# Patient Record
Sex: FEMALE | Race: BLACK OR AFRICAN AMERICAN | NOT HISPANIC OR LATINO | Employment: FULL TIME | URBAN - NONMETROPOLITAN AREA
[De-identification: names, ages, dates, MRNs, and addresses within clinical notes are randomized per-mention and may not be internally consistent; named-entity substitution may affect disease eponyms.]

---

## 2024-02-16 ENCOUNTER — HOSPITAL ENCOUNTER (EMERGENCY)
Facility: HOSPITAL | Age: 49
Discharge: HOME | End: 2024-02-16
Attending: EMERGENCY MEDICINE
Payer: COMMERCIAL

## 2024-02-16 VITALS — HEART RATE: 83 BPM | OXYGEN SATURATION: 96 % | RESPIRATION RATE: 20 BRPM

## 2024-02-16 DIAGNOSIS — M54.9 BACK PAIN: Primary | ICD-10-CM

## 2024-02-16 PROCEDURE — 96372 THER/PROPH/DIAG INJ SC/IM: CPT

## 2024-02-16 PROCEDURE — 99284 EMERGENCY DEPT VISIT MOD MDM: CPT | Performed by: EMERGENCY MEDICINE

## 2024-02-16 PROCEDURE — 99282 EMERGENCY DEPT VISIT SF MDM: CPT

## 2024-02-16 RX ORDER — CYCLOBENZAPRINE HCL 10 MG
10 TABLET ORAL 3 TIMES DAILY PRN
Qty: 20 TABLET | Refills: 0 | Status: SHIPPED | OUTPATIENT
Start: 2024-02-16

## 2024-02-16 RX ORDER — KETOROLAC TROMETHAMINE 30 MG/ML
30 INJECTION, SOLUTION INTRAMUSCULAR; INTRAVENOUS ONCE
Status: COMPLETED | OUTPATIENT
Start: 2024-02-16 | End: 2024-02-16

## 2024-02-16 RX ORDER — METHYLPREDNISOLONE 4 MG/1
TABLET ORAL
Qty: 21 TABLET | Refills: 0 | Status: SHIPPED | OUTPATIENT
Start: 2024-02-16

## 2024-02-16 RX ORDER — ACETAMINOPHEN 325 MG/1
650 TABLET ORAL ONCE
Status: COMPLETED | OUTPATIENT
Start: 2024-02-16 | End: 2024-02-16

## 2024-02-16 RX ADMIN — KETOROLAC TROMETHAMINE 30 MG: 30 INJECTION, SOLUTION INTRAMUSCULAR; INTRAVENOUS at 20:22

## 2024-02-16 RX ADMIN — ACETAMINOPHEN 650 MG: 325 TABLET, FILM COATED ORAL at 20:25

## 2024-02-16 NOTE — Clinical Note
Barrie Gaona was seen and treated in our emergency department on 2/16/2024.                Diagnosis:     Barrie  may return to work on return date.    She may return on this date: 02/17/2024         If you have any questions or concerns, please don't hesitate to call.      Holden Arteaga MD    ______________________________           _______________          _______________  Hospital Representative                              Date                                Time

## 2024-02-17 NOTE — ED NOTES
"Dr. Arteaga attempted to evaluate patient, asked pt to roll onto her back so he can assess her. The pt then started yelling at staff, stating \"you don't listen to what I tell you, you want to check everything else.\" Pt was not cooperative, would not answer the providers questions and demanded we let her go. Pt was given the choice to leave the emergency department or be evaluated and she agreed to be evaluated but was still not cooperative with staff. Security called after the pt started screaming again and is now at bedside.      Paola Grijalva RN  02/16/24 2020    "

## 2024-02-17 NOTE — ED PROVIDER NOTES
History  Chief Complaint   Patient presents with    Spasms     C/o back spasms for the last few hours, didn't take any medication at home       History provided by:  Medical records, patient and EMS personnel  Back Pain  Location:  Lumbar spine  Quality:  Aching  Radiates to:  Does not radiate  Pain severity:  Moderate  Pain is:  Worse during the night  Onset quality:  Sudden  Duration:  2 hours  Timing:  Constant  Progression:  Unchanged  Chronicity:  New  Context comment:  Patient has a history of sciatica, , she awoke from her nap in the cab, typical lower back pain, nonradiating.  Relieved by: Will not answer this question.  Exacerbated by: Will not answer this question.  Ineffective treatments: Will not answer this question.      None       History reviewed. No pertinent past medical history.    History reviewed. No pertinent surgical history.    History reviewed. No pertinent family history.  I have reviewed and agree with the history as documented.    E-Cigarette/Vaping     E-Cigarette/Vaping Substances     Social History     Tobacco Use    Smoking status: Every Day     Types: Cigarettes    Smokeless tobacco: Never   Substance Use Topics    Alcohol use: Not Currently    Drug use: Not Currently       Review of Systems   Unable to perform ROS: Other (Patient not cooperative, will not answer questions.  States it is only my fucking back)   Musculoskeletal:  Positive for back pain.       Physical Exam  Physical Exam  Vitals and nursing note reviewed.   Constitutional:       General: She is not in acute distress.     Appearance: Normal appearance. She is obese. She is not ill-appearing, toxic-appearing or diaphoretic.   HENT:      Head: Normocephalic and atraumatic.      Nose: Nose normal.   Cardiovascular:      Rate and Rhythm: Normal rate and regular rhythm.      Pulses: Normal pulses.      Heart sounds: Normal heart sounds. No murmur heard.     No gallop.   Pulmonary:      Effort: Pulmonary effort  is normal. No respiratory distress.      Comments: Patient refusing pulmonary exam  Abdominal:      General: Bowel sounds are normal. There is no distension.      Palpations: Abdomen is soft. There is no mass.      Tenderness: There is no abdominal tenderness. There is no right CVA tenderness, left CVA tenderness, guarding or rebound.      Hernia: No hernia is present.   Musculoskeletal:      Comments: Patient refusing back evaluation   Skin:     General: Skin is warm and dry.      Capillary Refill: Capillary refill takes less than 2 seconds.   Neurological:      General: No focal deficit present.      Mental Status: She is alert and oriented to person, place, and time.      Cranial Nerves: No cranial nerve deficit.      Sensory: No sensory deficit.      Motor: No weakness.      Comments: Patient moving around the gurney purposely, moving all extremities   Psychiatric:      Comments: Cursing, angry, demanding         Vital Signs  ED Triage Vitals [02/16/24 2009]   Temp Pulse Respirations BP SpO2   -- 83 20 -- 96 %      Temp src Heart Rate Source Patient Position - Orthostatic VS BP Location FiO2 (%)   -- Monitor Lying Right arm --      Pain Score       10 - Worst Possible Pain           Vitals:    02/16/24 2009   Pulse: 83   Patient Position - Orthostatic VS: Lying         Visual Acuity      ED Medications  Medications   ketorolac (TORADOL) injection 30 mg (30 mg Intramuscular Given 2/16/24 2022)   acetaminophen (TYLENOL) tablet 650 mg (650 mg Oral Given 2/16/24 2025)       Diagnostic Studies  Results Reviewed       None                   No orders to display              Procedures  Procedures         ED Course                               SBIRT 20yo+      Flowsheet Row Most Recent Value   Initial Alcohol Screen: US AUDIT-C     1. How often do you have a drink containing alcohol? 0 Filed at: 02/16/2024 2030   2. How many drinks containing alcohol do you have on a typical day you are drinking?  0 Filed at:  "02/16/2024 2030   3b. FEMALE Any Age, or MALE 65+: How often do you have 4 or more drinks on one occassion? 0 Filed at: 02/16/2024 2030   Audit-C Score 0 Filed at: 02/16/2024 2030   NOHEMY: How many times in the past year have you...    Used an illegal drug or used a prescription medication for non-medical reasons? Never Filed at: 02/16/2024 2030                      Medical Decision Making  2006: Patient appears well, vital signs reviewed.  Patient is uncooperative with examination, she is understands that my evaluation is limited due to this limited exam.  Patient states she knows what is wrong with her, that she has history of \"motherfucking sciatica\".  The patient is refusing further evaluation and diagnostics.  She wants pain relief and to be discharged.    Risk  OTC drugs.  Prescription drug management.             Disposition  Final diagnoses:   Back pain     Time reflects when diagnosis was documented in both MDM as applicable and the Disposition within this note       Time User Action Codes Description Comment    2/16/2024  8:16 PM Holden Arteaga Add [M54.9] Back pain           ED Disposition       ED Disposition   Discharge    Condition   Stable    Date/Time   Fri Feb 16, 2024 2016    Comment   Barrie Gaona discharge to home/self care.                   Follow-up Information       Follow up With Specialties Details Why Contact Info    PCP  Schedule an appointment as soon as possible for a visit  for further evaluation and treatment of back pain             Discharge Medication List as of 2/16/2024  9:46 PM        START taking these medications    Details   methylPREDNISolone 4 MG tablet therapy pack Use as directed on package, Print             No discharge procedures on file.    PDMP Review       None            ED Provider  Electronically Signed by             Holden Arteaga MD  02/17/24 0002    "

## 2024-02-19 ENCOUNTER — HOSPITAL ENCOUNTER (EMERGENCY)
Facility: HOSPITAL | Age: 49
Discharge: HOME | End: 2024-02-19
Attending: EMERGENCY MEDICINE
Payer: COMMERCIAL

## 2024-02-19 ENCOUNTER — APPOINTMENT (EMERGENCY)
Dept: MRI IMAGING | Facility: HOSPITAL | Age: 49
End: 2024-02-19
Payer: COMMERCIAL

## 2024-02-19 VITALS
SYSTOLIC BLOOD PRESSURE: 158 MMHG | WEIGHT: 292.33 LBS | HEIGHT: 65 IN | DIASTOLIC BLOOD PRESSURE: 82 MMHG | BODY MASS INDEX: 48.71 KG/M2 | HEART RATE: 80 BPM | TEMPERATURE: 98.6 F | RESPIRATION RATE: 18 BRPM | OXYGEN SATURATION: 97 %

## 2024-02-19 DIAGNOSIS — M51.36 DEGENERATIVE DISC DISEASE, LUMBAR: ICD-10-CM

## 2024-02-19 DIAGNOSIS — M54.41 ACUTE RIGHT-SIDED LOW BACK PAIN WITH RIGHT-SIDED SCIATICA: Primary | ICD-10-CM

## 2024-02-19 LAB
BILIRUB UR QL STRIP: NEGATIVE
CLARITY UR: NORMAL
COLOR UR: YELLOW
GLUCOSE UR STRIP-MCNC: NEGATIVE MG/DL
HGB UR QL STRIP.AUTO: NEGATIVE
KETONES UR STRIP-MCNC: NEGATIVE MG/DL
LEUKOCYTE ESTERASE UR QL STRIP: NEGATIVE
NITRITE UR QL STRIP: NEGATIVE
PH UR STRIP.AUTO: 7 [PH]
PROT UR STRIP-MCNC: NEGATIVE MG/DL
SP GR UR STRIP.AUTO: 1.01 (ref 1–1.03)
UROBILINOGEN UR QL STRIP.AUTO: 0.2 E.U./DL

## 2024-02-19 PROCEDURE — 99284 EMERGENCY DEPT VISIT MOD MDM: CPT

## 2024-02-19 PROCEDURE — 72148 MRI LUMBAR SPINE W/O DYE: CPT

## 2024-02-19 PROCEDURE — 99285 EMERGENCY DEPT VISIT HI MDM: CPT | Performed by: EMERGENCY MEDICINE

## 2024-02-19 PROCEDURE — 81003 URINALYSIS AUTO W/O SCOPE: CPT | Performed by: EMERGENCY MEDICINE

## 2024-02-19 PROCEDURE — 96361 HYDRATE IV INFUSION ADD-ON: CPT

## 2024-02-19 PROCEDURE — 96375 TX/PRO/DX INJ NEW DRUG ADDON: CPT

## 2024-02-19 PROCEDURE — 96374 THER/PROPH/DIAG INJ IV PUSH: CPT

## 2024-02-19 PROCEDURE — 96372 THER/PROPH/DIAG INJ SC/IM: CPT

## 2024-02-19 RX ORDER — OXYCODONE HYDROCHLORIDE AND ACETAMINOPHEN 5; 325 MG/1; MG/1
1 TABLET ORAL EVERY 4 HOURS PRN
Qty: 12 TABLET | Refills: 0 | Status: SHIPPED | OUTPATIENT
Start: 2024-02-19

## 2024-02-19 RX ORDER — DEXAMETHASONE SODIUM PHOSPHATE 10 MG/ML
6 INJECTION, SOLUTION INTRAMUSCULAR; INTRAVENOUS ONCE
Status: COMPLETED | OUTPATIENT
Start: 2024-02-19 | End: 2024-02-19

## 2024-02-19 RX ORDER — HYDROMORPHONE HCL/PF 1 MG/ML
0.5 SYRINGE (ML) INJECTION ONCE
Status: COMPLETED | OUTPATIENT
Start: 2024-02-19 | End: 2024-02-19

## 2024-02-19 RX ORDER — HYDROMORPHONE HCL IN WATER/PF 6 MG/30 ML
0.2 PATIENT CONTROLLED ANALGESIA SYRINGE INTRAVENOUS ONCE
Status: DISCONTINUED | OUTPATIENT
Start: 2024-02-19 | End: 2024-02-19

## 2024-02-19 RX ORDER — HYDROMORPHONE HCL IN WATER/PF 6 MG/30 ML
0.2 PATIENT CONTROLLED ANALGESIA SYRINGE INTRAVENOUS ONCE
Status: COMPLETED | OUTPATIENT
Start: 2024-02-19 | End: 2024-02-19

## 2024-02-19 RX ADMIN — DEXAMETHASONE SODIUM PHOSPHATE 6 MG: 10 INJECTION, SOLUTION INTRAMUSCULAR; INTRAVENOUS at 19:19

## 2024-02-19 RX ADMIN — SODIUM CHLORIDE 1000 ML: 0.9 INJECTION, SOLUTION INTRAVENOUS at 18:31

## 2024-02-19 RX ADMIN — HYDROMORPHONE HYDROCHLORIDE 0.2 MG: 0.2 INJECTION, SOLUTION INTRAMUSCULAR; INTRAVENOUS; SUBCUTANEOUS at 17:33

## 2024-02-19 RX ADMIN — HYDROMORPHONE HYDROCHLORIDE 0.5 MG: 1 INJECTION, SOLUTION INTRAMUSCULAR; INTRAVENOUS; SUBCUTANEOUS at 19:19

## 2024-02-19 NOTE — Clinical Note
Barrie Gaona was seen and treated in our emergency department on 2/19/2024.        No school until cleared by Family Doctor/Orthopedics        Diagnosis:     Barrie  .    She may return on this date:          If you have any questions or concerns, please don't hesitate to call.      Jody Dyer, DO    ______________________________           _______________          _______________  Hospital Representative                              Date                                Time

## 2024-02-19 NOTE — ED PROVIDER NOTES
History  Chief Complaint   Patient presents with    Back Pain     Seen here 2/16 for back pain. Reports worsening lower back pain with urinary incontinence since yesterday. Denies new injury     Patient is a 49-year-old female presenting to the emergency department complaining of worsening low back pain with urinary incontinence that started yesterday, she reports history of a back injury in September, she completed 7 weeks of physical therapy and was feeling much better, however on Friday her back pain returned, she denies any new injury or trauma, she reports some pain shooting down her leg at times but pain today is different than her previously diagnosed sciatica, she also developed urinary incontinence, denies any fecal incontinence, no numbness or tingling in her groin area or her lower extremities, denies dysuria or hematuria, was seen here 3 days ago for similar symptoms, at that point declined any imaging but now agreeable to imaging, she did fill prescriptions for methylprednisolone and Flexeril today, tried taking prescribed dosages with no relief of symptoms        Prior to Admission Medications   Prescriptions Last Dose Informant Patient Reported? Taking?   cyclobenzaprine (FLEXERIL) 10 mg tablet   No No   Sig: Take 1 tablet (10 mg total) by mouth 3 (three) times a day as needed for muscle spasms   methylPREDNISolone 4 MG tablet therapy pack   No No   Sig: Use as directed on package      Facility-Administered Medications: None       History reviewed. No pertinent past medical history.    History reviewed. No pertinent surgical history.    History reviewed. No pertinent family history.  I have reviewed and agree with the history as documented.    E-Cigarette/Vaping     E-Cigarette/Vaping Substances     Social History     Tobacco Use    Smoking status: Every Day     Types: Cigarettes    Smokeless tobacco: Never   Substance Use Topics    Alcohol use: Not Currently    Drug use: Not Currently       Review of  Systems   Constitutional: Negative.    HENT: Negative.     Eyes: Negative.    Respiratory: Negative.     Cardiovascular: Negative.    Gastrointestinal: Negative.    Endocrine: Negative.    Genitourinary: Negative.         Urinary incontinence   Musculoskeletal:  Positive for back pain.   Skin: Negative.    Allergic/Immunologic: Negative.    Neurological: Negative.    Hematological: Negative.    Psychiatric/Behavioral: Negative.         Physical Exam  Physical Exam  Constitutional:       Appearance: She is well-developed. She is obese.   HENT:      Head: Normocephalic and atraumatic.   Eyes:      Conjunctiva/sclera: Conjunctivae normal.      Pupils: Pupils are equal, round, and reactive to light.   Cardiovascular:      Rate and Rhythm: Normal rate.   Pulmonary:      Effort: Pulmonary effort is normal.   Abdominal:      Palpations: Abdomen is soft.   Musculoskeletal:         General: Normal range of motion.        Arms:       Cervical back: Normal range of motion and neck supple.      Comments: Tenderness to palpate in the region, pain with straight leg raise on the right   Skin:     General: Skin is warm and dry.   Neurological:      Mental Status: She is alert and oriented to person, place, and time.         Vital Signs  ED Triage Vitals [02/19/24 1714]   Temperature Pulse Respirations Blood Pressure SpO2   98.6 °F (37 °C) 85 16 (!) 190/95 97 %      Temp Source Heart Rate Source Patient Position - Orthostatic VS BP Location FiO2 (%)   Temporal Monitor -- Right arm --      Pain Score       10 - Worst Possible Pain           Vitals:    02/19/24 1714   BP: (!) 190/95   Pulse: 85         Visual Acuity      ED Medications  Medications   sodium chloride 0.9 % bolus 1,000 mL (1,000 mL Intravenous New Bag 2/19/24 1831)   HYDROmorphone HCl (DILAUDID) injection 0.2 mg (0.2 mg Intramuscular Given 2/19/24 1733)   dexamethasone (PF) (DECADRON) injection 6 mg (6 mg Intravenous Given 2/19/24 1919)   HYDROmorphone (DILAUDID)  injection 0.5 mg (0.5 mg Intravenous Given 2/19/24 1919)       Diagnostic Studies  Results Reviewed       Procedure Component Value Units Date/Time    UA w Reflex to Microscopic w Reflex to Culture [[id number]] Collected: 02/19/24 1732    Lab Status: Final result Specimen: Urine, Clean Catch Updated: 02/19/24 1806     Color, UA Yellow     Clarity, UA Slightly Cloudy     Specific Gravity, UA 1.015     pH, UA 7.0     Leukocytes, UA Negative     Nitrite, UA Negative     Protein, UA Negative mg/dl      Glucose, UA Negative mg/dl      Ketones, UA Negative mg/dl      Urobilinogen, UA 0.2 E.U./dl      Bilirubin, UA Negative     Occult Blood, UA Negative                   MRI lumbar spine wo contrast   Final Result by Nimesh Steven MD (02/19 1844)   Mild spondylotic changes of the lumbar spine as detailed above.         Workstation performed: [id number]                    Procedures  Procedures         ED Course  ED Course as of 02/19/24 1948 Mon Feb 19, 2024 1948 Patient reports feeling significant relief with pain medications provided thus far, imaging findings discussed at bedside as well as disposition plan                                             Medical Decision Making  Patient presents with low back pain most likely consistent with lumbar radiculopathy.  Differential diagnosis includes lumbago versus musculoskeletal spasm/sprain versus sciatica.  No back pain red flags on history or physical.  Presentation not consistent with malignancy, fracture, cauda equina syndrome, AAA, viscus perforation, osteomyelitis or epidural abscess, renal colic, pyelonephritis or other infectious process.  Given the clinical picture, no indication for further imaging at this time.  MRI shows mild disc bulging with no central disc protrusion or evidence of cauda equina syndrome      Problems Addressed:  Acute right-sided low back pain with right-sided sciatica: acute illness or injury  Degenerative disc disease, lumbar: acute illness  or injury    Amount and/or Complexity of Data Reviewed  Radiology: ordered. Decision-making details documented in ED Course.    Risk  OTC drugs.  Prescription drug management.             Disposition  Final diagnoses:   Acute right-sided low back pain with right-sided sciatica   Degenerative disc disease, lumbar     Time reflects when diagnosis was documented in both MDM as applicable and the Disposition within this note       Time User Action Codes Description Comment    2/19/2024  7:03 PM Jody Dyer [M54.41] Acute right-sided low back pain with right-sided sciatica     2/19/2024  7:03 PM Jody Dyer [M51.36] Degenerative disc disease, lumbar           ED Disposition       ED Disposition   Discharge    Condition   Stable    Date/Time   Mon Feb 19, 2024  7:03 PM    Comment   Barrie Gaona discharge to home/self care.                   Follow-up Information       Follow up With Specialties Details Why Contact Info    Rojelio Sin MD Pain Medicine, Interventional Radiology In 1 week  [address] [address]  [phone]              Patient's Medications   Discharge Prescriptions    OXYCODONE-ACETAMINOPHEN (PERCOCET) 5-325 MG PER TABLET    Take 1 tablet by mouth every 4 (four) hours as needed for moderate pain for up to 12 doses Max Daily Amount: 6 tablets       Start Date: 2/19/2024 End Date: --       Order Dose: 1 tablet       Quantity: 12 tablet    Refills: 0           PDMP Review       None            ED Provider  Electronically Signed by             Jody Dyer DO  02/19/24 3905

## 2024-02-19 NOTE — Clinical Note
Barrie Gaona was seen and treated in our emergency department on 2/19/2024.        No work until cleared by Family Doctor/Orthopedics        Diagnosis:     Barrie  .    She may return on this date:          If you have any questions or concerns, please don't hesitate to call.      Jody Dyer, DO    ______________________________           _______________          _______________  Hospital Representative                              Date                                Time

## 2024-03-06 ENCOUNTER — TELEPHONE (OUTPATIENT)
Age: 49
End: 2024-03-06

## 2024-03-06 ENCOUNTER — CONSULT (OUTPATIENT)
Dept: PAIN MEDICINE | Facility: CLINIC | Age: 49
End: 2024-03-06
Payer: COMMERCIAL

## 2024-03-06 VITALS
OXYGEN SATURATION: 97 % | DIASTOLIC BLOOD PRESSURE: 88 MMHG | HEART RATE: 97 BPM | TEMPERATURE: 96.5 F | RESPIRATION RATE: 18 BRPM | WEIGHT: 284 LBS | HEIGHT: 65 IN | BODY MASS INDEX: 47.32 KG/M2 | SYSTOLIC BLOOD PRESSURE: 138 MMHG

## 2024-03-06 DIAGNOSIS — M51.36 DEGENERATIVE DISC DISEASE, LUMBAR: ICD-10-CM

## 2024-03-06 DIAGNOSIS — M47.816 LUMBAR SPONDYLOSIS: ICD-10-CM

## 2024-03-06 DIAGNOSIS — M47.816 LUMBAR SPONDYLOSIS: Primary | ICD-10-CM

## 2024-03-06 PROCEDURE — 99244 OFF/OP CNSLTJ NEW/EST MOD 40: CPT | Performed by: ANESTHESIOLOGY

## 2024-03-06 RX ORDER — GABAPENTIN 300 MG/1
300 CAPSULE ORAL 3 TIMES DAILY
Qty: 90 CAPSULE | Refills: 2 | Status: SHIPPED | OUTPATIENT
Start: 2024-03-06

## 2024-03-06 RX ORDER — GABAPENTIN 300 MG/1
300 CAPSULE ORAL 3 TIMES DAILY
Qty: 90 CAPSULE | Refills: 2 | Status: SHIPPED | OUTPATIENT
Start: 2024-03-06 | End: 2024-03-06 | Stop reason: SDUPTHER

## 2024-03-06 NOTE — H&P (VIEW-ONLY)
Assessment  1. Lumbar spondylosis - Bilateral    2. Degenerative disc disease, lumbar  -     Ambulatory referral to Spine & Pain Management    Axial low back pain described primarily by arthritic features.  Aching, nagging, indolent, stabbing, throbbing features in axial low back without radicular components.  5/5 strength bilaterally, negative SLR.  Positive facet loading maneuvers in lumbar spine elicited pain, positive tenderness to palpation over lumbar paraspinal muscles.  Findings correlate with prominent lumbar facet arthropathy seen throughout axial low back on x-ray.  Currently she is neurologically intact without gait instability, saddle anesthesia or bowel/bladder abnormality. Risks, benefits and alternatives to FATUMA, medial branch blocks and subsequent radiofrequency ablation if successful thoroughly discussed with patient.  Handouts provided questions answered to patient satisfaction.    The patient has been experiencing moderate to severe axial spine pain that is causing functional deficit.  The pain has been present for at least 3 months and is not improving with conservative care.  Currently the patient is not experiencing any radicular features nor neurogenic claudication.  Non-facet pathology has been ruled out on clinical evaluation.    Plan  -L4-L5 LESI; (may call back to schedule procedure)  -gabapentin 300 mg t.i.d. Ordered for patient; counseled regarding sedative effects of taking this medication and provided up titration calendar.  Counseled not to take medication while driving or operating heavy machinery/using stairs  -script provided for formal physical therapy for lumbar spondylosis; Physician directed home exercise plan as per AAOS demonstrated and handouts provided that patient plans to participate with for 1 hour, twice a week for the next 6 weeks.     There are risks associated with opioid medications, including dependence, addiction and tolerance. The patient understands and  agrees to use these medications only as prescribed. Potential side effects of the medications include, but are not limited to, constipation, drowsiness, addiction, impaired judgment and risk of fatal overdose if not taken as prescribed. The patient was warned against driving while taking sedation medications or operating heavy machinery. The patient voiced understanding. Sharing medications is a felony. At this point in time, the patient is showing no signs of addiction, abuse, diversion or suicidal ideation.     [place] Prescription Drug Monitoring Program report was reviewed and was appropriate      Complete risks and benefits including bleeding, infection, tissue reaction, nerve injury and allergic reaction were discussed. The approach was demonstrated using models and literature was provided. Verbal and written consent was obtained.     My impressions and treatment recommendations were discussed in detail with the patient who verbalized understanding and had no further questions.  Discharge instructions were provided. I personally saw and examined the patient and I agree with the above discussed plan of care.    No orders of the defined types were placed in this encounter.      History of Present Illness    Barrie Gaona is a 49 y.o. female with pmhx of obesity, 1PPD smoker, presenting with with a past medical history of chronic low back pain described primarily as arthritic in nature.  She describes 8/10 low back pain that is worse in the mornings and worse at the end of the day.  The pain is characterized by achy, nagging, indolent, crampy, stabbing pain in her axial low back.  The patient describes that the pain is worse with standing for long periods of time on hard surfaces as well as with walking.  The patient is a very active individual and feels as though this pain compromises his participation with independent activities of daily living. The pain can be debilitating at times and contribute to  significant disability, compromising overall activity and independent activities of daily living.  She has tried physical therapy with limited relief of symptoms.  Medications the patient has tried in the past include nsaids, tylenol. She describes no radicular symptoms and has good strength.  She denies any weakness numbness or paresthesias. The patient denies any bowel or bladder dysfunction, saddle anesthesia or gait instability as well.      I have personally reviewed and/or updated the patient's past medical history, past surgical history, family history, social history, current medications, allergies, and vital signs today.     Review of Systems   Constitutional:  Positive for activity change.   HENT: Negative.     Eyes: Negative.    Respiratory: Negative.     Cardiovascular: Negative.    Gastrointestinal: Negative.    Endocrine: Negative.    Genitourinary: Negative.    Musculoskeletal:  Positive for arthralgias, back pain, gait problem and myalgias.   Skin: Negative.    Allergic/Immunologic: Negative.    Neurological:  Negative for weakness and numbness.   Hematological: Negative.    Psychiatric/Behavioral: Negative.     All other systems reviewed and are negative.      There is no problem list on file for this patient.      History reviewed. No pertinent past medical history.    Past Surgical History:   Procedure Laterality Date    REPAIR LABRUM Left 07/2015       History reviewed. No pertinent family history.    Social History     Occupational History    Not on file   Tobacco Use    Smoking status: Every Day     Types: Cigarettes    Smokeless tobacco: Never   Vaping Use    Vaping status: Never Used   Substance and Sexual Activity    Alcohol use: Not Currently    Drug use: Not Currently    Sexual activity: Not on file       Current Outpatient Medications on File Prior to Visit   Medication Sig    cyclobenzaprine (FLEXERIL) 10 mg tablet Take 1 tablet (10 mg total) by mouth 3 (three) times a day as needed for  "muscle spasms (Patient not taking: Reported on 3/6/2024)    methylPREDNISolone 4 MG tablet therapy pack Use as directed on package (Patient not taking: Reported on 3/6/2024)    oxyCODONE-acetaminophen (Percocet) 5-325 mg per tablet Take 1 tablet by mouth every 4 (four) hours as needed for moderate pain for up to 12 doses Max Daily Amount: 6 tablets (Patient not taking: Reported on 3/6/2024)     No current facility-administered medications on file prior to visit.       Allergies   Allergen Reactions    Penicillins Hives    Tetanus Toxoids Other (See Comments)     Paralysis    Tetanus-Diphth-Acell Pertussis Other (See Comments)     \"Paralyzed\"     Physical Exam    /88 (BP Location: Left arm, Patient Position: Sitting, Cuff Size: Large)   Pulse 97   Temp (!) 96.5 °F (35.8 °C)   Resp 18   Ht 5' 5\" (1.651 m)   Wt 129 kg (284 lb)   SpO2 97%   BMI 47.26 kg/m²     Constitutional: normal, well developed, well nourished, alert, in no distress and non-toxic and no overt pain behavior. and obese  Eyes: anicteric  HEENT: grossly intact  Neck: supple, symmetric, trachea midline and no masses   Pulmonary:even and unlabored  Cardiovascular:No edema or pitting edema present  Skin:Normal without rashes or lesions and well hydrated  Psychiatric:Mood and affect appropriate  Neurologic:Cranial Nerves II-XII grossly intact Sensation grossly intact; no clonus negative gonzalez's. Reflexes 2+ and brisk. SLR negative bilaterally.  Musculoskeletal:normal gait. 5/5 strength bilaterally with AROM in lower extremities. Normal heel toe and tip toe walking. Significant pain with lumbar facet loading bilaterally and with lateral spine rotation. ttp over lumbar paraspinal muscles. Negative regis's test, negative gaenslen's negative SIJ loading bilaterally.    Imaging    MRI LUMBAR SPINE WITHOUT CONTRAST     INDICATION: Incontinence.     COMPARISON:  None.     TECHNIQUE:  Multiplanar, multisequence imaging of the lumbar spine was " performed. .        IMAGE QUALITY:  Diagnostic     FINDINGS:     VERTEBRAL BODIES:  There is a transitional lumbosacral junction with sacralization of L5 bilaterally.  Normal alignment of the lumbar spine.  No spondylolysis or spondylolisthesis. No scoliosis.  No compression fracture.    Normal marrow signal is   identified within the visualized bony structures.  No discrete marrow lesion.     SACRUM:  Normal signal within the sacrum. No evidence of insufficiency or stress fracture.     DISTAL CORD AND CONUS:  Normal size and signal within the distal cord and conus.     PARASPINAL SOFT TISSUES:  Paraspinal soft tissues are unremarkable.     LOWER THORACIC DISC SPACES:  Normal disc height and signal.  No disc herniation, canal stenosis or foraminal narrowing.     LUMBAR DISC SPACES:     L1-L2:  Normal.     L2-L3: Minor bulge without significant stenosis.     L3-L4: Minor bulge and facet arthrosis resulting in mild left foraminal stenosis.     L4-L5: Disc desiccation with preserved disc height. Diffuse disc bulge and bilateral facet arthrosis with superimposed small central disc protrusion. Mild bilateral foraminal narrowing. Mild central canal stenosis.     L5-S1: Mild facet arthrosis without encroachment.     OTHER FINDINGS:  None.     IMPRESSION:  Mild spondylotic changes of the lumbar spine as detailed above.

## 2024-03-06 NOTE — TELEPHONE ENCOUNTER
Patient called the RX Refill Line. Message is being forwarded to the office.     Patient is requesting to resend script to pharmacy - pharmacy has not received prescription request. Patient states she needs the script resent right away. She has a lyft driving who is driving her around and does not want to take up anymore of his time.     Please contact patient at

## 2024-03-06 NOTE — LETTER
March 6, 2024     Jody Dyer, DO  [address] [facility] [address]    Patient: Barrie Gaona   YOB: 1975   Date of Visit: 3/6/2024       Dear Dr. Dyer:    Thank you for referring Barrie Gaona to me for evaluation. Below are my notes for this consultation.    If you have questions, please do not hesitate to call me. I look forward to following your patient along with you.         Sincerely,        Rojelio Sin MD        CC: No Recipients    Rojelio Sin MD  3/6/2024  1:43 PM  Signed      Assessment  1. Lumbar spondylosis - Bilateral    2. Degenerative disc disease, lumbar  -     Ambulatory referral to Spine & Pain Management    Axial low back pain described primarily by arthritic features.  Aching, nagging, indolent, stabbing, throbbing features in axial low back without radicular components.  5/5 strength bilaterally, negative SLR.  Positive facet loading maneuvers in lumbar spine elicited pain, positive tenderness to palpation over lumbar paraspinal muscles.  Findings correlate with prominent lumbar facet arthropathy seen throughout axial low back on x-ray.  Currently she is neurologically intact without gait instability, saddle anesthesia or bowel/bladder abnormality. Risks, benefits and alternatives to FATUMA, medial branch blocks and subsequent radiofrequency ablation if successful thoroughly discussed with patient.  Handouts provided questions answered to patient satisfaction.    The patient has been experiencing moderate to severe axial spine pain that is causing functional deficit.  The pain has been present for at least 3 months and is not improving with conservative care.  Currently the patient is not experiencing any radicular features nor neurogenic claudication.  Non-facet pathology has been ruled out on clinical evaluation.    Plan  -L4-L5 LESI; (may call back to schedule procedure)  -gabapentin 300 mg t.i.d. Ordered for patient; counseled regarding sedative  effects of taking this medication and provided up titration calendar.  Counseled not to take medication while driving or operating heavy machinery/using stairs  -script provided for formal physical therapy for lumbar spondylosis; Physician directed home exercise plan as per AAOS demonstrated and handouts provided that patient plans to participate with for 1 hour, twice a week for the next 6 weeks.     There are risks associated with opioid medications, including dependence, addiction and tolerance. The patient understands and agrees to use these medications only as prescribed. Potential side effects of the medications include, but are not limited to, constipation, drowsiness, addiction, impaired judgment and risk of fatal overdose if not taken as prescribed. The patient was warned against driving while taking sedation medications or operating heavy machinery. The patient voiced understanding. Sharing medications is a felony. At this point in time, the patient is showing no signs of addiction, abuse, diversion or suicidal ideation.     [place] Prescription Drug Monitoring Program report was reviewed and was appropriate      Complete risks and benefits including bleeding, infection, tissue reaction, nerve injury and allergic reaction were discussed. The approach was demonstrated using models and literature was provided. Verbal and written consent was obtained.     My impressions and treatment recommendations were discussed in detail with the patient who verbalized understanding and had no further questions.  Discharge instructions were provided. I personally saw and examined the patient and I agree with the above discussed plan of care.    No orders of the defined types were placed in this encounter.      History of Present Illness    Barrie Gaona is a 49 y.o. female with pmhx of obesity, 1PPD smoker, presenting with with a past medical history of chronic low back pain described primarily as arthritic in nature.   She describes 8/10 low back pain that is worse in the mornings and worse at the end of the day.  The pain is characterized by achy, nagging, indolent, crampy, stabbing pain in her axial low back.  The patient describes that the pain is worse with standing for long periods of time on hard surfaces as well as with walking.  The patient is a very active individual and feels as though this pain compromises his participation with independent activities of daily living. The pain can be debilitating at times and contribute to significant disability, compromising overall activity and independent activities of daily living.  She has tried physical therapy with limited relief of symptoms.  Medications the patient has tried in the past include nsaids, tylenol. She describes no radicular symptoms and has good strength.  She denies any weakness numbness or paresthesias. The patient denies any bowel or bladder dysfunction, saddle anesthesia or gait instability as well.      I have personally reviewed and/or updated the patient's past medical history, past surgical history, family history, social history, current medications, allergies, and vital signs today.     Review of Systems   Constitutional:  Positive for activity change.   HENT: Negative.     Eyes: Negative.    Respiratory: Negative.     Cardiovascular: Negative.    Gastrointestinal: Negative.    Endocrine: Negative.    Genitourinary: Negative.    Musculoskeletal:  Positive for arthralgias, back pain, gait problem and myalgias.   Skin: Negative.    Allergic/Immunologic: Negative.    Neurological:  Negative for weakness and numbness.   Hematological: Negative.    Psychiatric/Behavioral: Negative.     All other systems reviewed and are negative.      There is no problem list on file for this patient.      History reviewed. No pertinent past medical history.    Past Surgical History:   Procedure Laterality Date   • REPAIR LABRUM Left 07/2015       History reviewed. No  "pertinent family history.    Social History     Occupational History   • Not on file   Tobacco Use   • Smoking status: Every Day     Types: Cigarettes   • Smokeless tobacco: Never   Vaping Use   • Vaping status: Never Used   Substance and Sexual Activity   • Alcohol use: Not Currently   • Drug use: Not Currently   • Sexual activity: Not on file       Current Outpatient Medications on File Prior to Visit   Medication Sig   • cyclobenzaprine (FLEXERIL) 10 mg tablet Take 1 tablet (10 mg total) by mouth 3 (three) times a day as needed for muscle spasms (Patient not taking: Reported on 3/6/2024)   • methylPREDNISolone 4 MG tablet therapy pack Use as directed on package (Patient not taking: Reported on 3/6/2024)   • oxyCODONE-acetaminophen (Percocet) 5-325 mg per tablet Take 1 tablet by mouth every 4 (four) hours as needed for moderate pain for up to 12 doses Max Daily Amount: 6 tablets (Patient not taking: Reported on 3/6/2024)     No current facility-administered medications on file prior to visit.       Allergies   Allergen Reactions   • Penicillins Hives   • Tetanus Toxoids Other (See Comments)     Paralysis   • Tetanus-Diphth-Acell Pertussis Other (See Comments)     \"Paralyzed\"     Physical Exam    /88 (BP Location: Left arm, Patient Position: Sitting, Cuff Size: Large)   Pulse 97   Temp (!) 96.5 °F (35.8 °C)   Resp 18   Ht 5' 5\" (1.651 m)   Wt 129 kg (284 lb)   SpO2 97%   BMI 47.26 kg/m²     Constitutional: normal, well developed, well nourished, alert, in no distress and non-toxic and no overt pain behavior. and obese  Eyes: anicteric  HEENT: grossly intact  Neck: supple, symmetric, trachea midline and no masses   Pulmonary:even and unlabored  Cardiovascular:No edema or pitting edema present  Skin:Normal without rashes or lesions and well hydrated  Psychiatric:Mood and affect appropriate  Neurologic:Cranial Nerves II-XII grossly intact Sensation grossly intact; no clonus negative gonzalez's. Reflexes 2+ " and brisk. SLR negative bilaterally.  Musculoskeletal:normal gait. 5/5 strength bilaterally with AROM in lower extremities. Normal heel toe and tip toe walking. Significant pain with lumbar facet loading bilaterally and with lateral spine rotation. ttp over lumbar paraspinal muscles. Negative regis's test, negative gaenslen's negative SIJ loading bilaterally.    Imaging    MRI LUMBAR SPINE WITHOUT CONTRAST     INDICATION: Incontinence.     COMPARISON:  None.     TECHNIQUE:  Multiplanar, multisequence imaging of the lumbar spine was performed. .        IMAGE QUALITY:  Diagnostic     FINDINGS:     VERTEBRAL BODIES:  There is a transitional lumbosacral junction with sacralization of L5 bilaterally.  Normal alignment of the lumbar spine.  No spondylolysis or spondylolisthesis. No scoliosis.  No compression fracture.    Normal marrow signal is   identified within the visualized bony structures.  No discrete marrow lesion.     SACRUM:  Normal signal within the sacrum. No evidence of insufficiency or stress fracture.     DISTAL CORD AND CONUS:  Normal size and signal within the distal cord and conus.     PARASPINAL SOFT TISSUES:  Paraspinal soft tissues are unremarkable.     LOWER THORACIC DISC SPACES:  Normal disc height and signal.  No disc herniation, canal stenosis or foraminal narrowing.     LUMBAR DISC SPACES:     L1-L2:  Normal.     L2-L3: Minor bulge without significant stenosis.     L3-L4: Minor bulge and facet arthrosis resulting in mild left foraminal stenosis.     L4-L5: Disc desiccation with preserved disc height. Diffuse disc bulge and bilateral facet arthrosis with superimposed small central disc protrusion. Mild bilateral foraminal narrowing. Mild central canal stenosis.     L5-S1: Mild facet arthrosis without encroachment.     OTHER FINDINGS:  None.     IMPRESSION:  Mild spondylotic changes of the lumbar spine as detailed above.

## 2024-03-06 NOTE — TELEPHONE ENCOUNTER
[facility] pharmacist. Confirmed the pt picked up the rx earlier.         Please cancel the attached refill request

## 2024-03-13 ENCOUNTER — TELEPHONE (OUTPATIENT)
Age: 49
End: 2024-03-13

## 2024-03-25 ENCOUNTER — TELEPHONE (OUTPATIENT)
Age: 49
End: 2024-03-25

## 2024-03-25 NOTE — TELEPHONE ENCOUNTER
S/w pt pt reports some relief with gabapentin but is only taking it 2 x a day as 3 x a day made her feel like she was in a fog. Pt has attended 3 sessions of PT, today while at PT her back was in spasms.  Pt was unsure if she was allowed to take tylenol with gabapentin, nurse informed pt she can take tylenol up to 3,000mg in 24 hours or 1,000mg 3 x a day. Pt will try salonpas, she cannot take ibuprofen as it hurts her stomach.  Nurse reviewed chart and asked pt if she thought about LESI that VS recommended.  Pt still has not decided on injection but will call if she would like to move fwd.  Nurse informed pt nurse to discuss with VS and CB with further recommendations. Pt appreciative of call.

## 2024-03-25 NOTE — TELEPHONE ENCOUNTER
Caller: Barrie     Doctor: Merrick    Reason for call: Patient calling and would like to move forward with procedure please advise     Call back#: [phone]

## 2024-03-25 NOTE — TELEPHONE ENCOUNTER
Caller: Barrie    Doctor: Merrick    Reason for call: Pt is currently still having pain and wanted to know what can be done to help give her some relief her pain is a 7 out of 10 PT doesn't seem to be helping to relieve the pain.     Please advise    Call back#: [phone]

## 2024-03-25 NOTE — TELEPHONE ENCOUNTER
Rojelio Sin MD  You1 minute ago (3:50 PM)       No further recommendations; can call back to schedule ILESI or f/u in clinic thanks

## 2024-03-26 ENCOUNTER — PREP FOR PROCEDURE (OUTPATIENT)
Dept: PAIN MEDICINE | Facility: CLINIC | Age: 49
End: 2024-03-26

## 2024-03-26 DIAGNOSIS — M47.816 LUMBAR SPONDYLOSIS: Primary | ICD-10-CM

## 2024-03-26 NOTE — TELEPHONE ENCOUNTER
Spoke to patient and she is scheduled.  Patient aware of instructions. No food/drink one hour prior. Wear comfortable clothing. A  is required. Denies blood thinners. Continue all other prescribed medications on day of procedure. Call if prescribed an antibiotic or become sick. Refrain from vaccinations two weeks prior and two weeks after. Patient aware [facility] nurse will call day prior with instructions and report time. Call with questions.

## 2024-04-02 ENCOUNTER — HOSPITAL ENCOUNTER (OUTPATIENT)
Dept: INTERVENTIONAL RADIOLOGY/VASCULAR | Facility: HOSPITAL | Age: 49
Discharge: HOME | End: 2024-04-02
Attending: ANESTHESIOLOGY | Admitting: ANESTHESIOLOGY
Payer: COMMERCIAL

## 2024-04-02 VITALS
SYSTOLIC BLOOD PRESSURE: 141 MMHG | BODY MASS INDEX: 47.32 KG/M2 | RESPIRATION RATE: 18 BRPM | WEIGHT: 284 LBS | OXYGEN SATURATION: 97 % | DIASTOLIC BLOOD PRESSURE: 66 MMHG | TEMPERATURE: 97 F | HEART RATE: 78 BPM | HEIGHT: 65 IN

## 2024-04-02 DIAGNOSIS — M47.816 LUMBAR SPONDYLOSIS: ICD-10-CM

## 2024-04-02 PROCEDURE — 62323 NJX INTERLAMINAR LMBR/SAC: CPT | Performed by: ANESTHESIOLOGY

## 2024-04-02 RX ORDER — LIDOCAINE HYDROCHLORIDE 10 MG/ML
INJECTION, SOLUTION EPIDURAL; INFILTRATION; INTRACAUDAL; PERINEURAL AS NEEDED
Status: COMPLETED | OUTPATIENT
Start: 2024-04-02 | End: 2024-04-02

## 2024-04-02 RX ORDER — METHYLPREDNISOLONE ACETATE 80 MG/ML
INJECTION, SUSPENSION INTRA-ARTICULAR; INTRALESIONAL; INTRAMUSCULAR; PARENTERAL; SOFT TISSUE AS NEEDED
Status: COMPLETED | OUTPATIENT
Start: 2024-04-02 | End: 2024-04-02

## 2024-04-02 RX ORDER — 0.9 % SODIUM CHLORIDE 0.9 %
VIAL (ML) INJECTION AS NEEDED
Status: COMPLETED | OUTPATIENT
Start: 2024-04-02 | End: 2024-04-02

## 2024-04-02 RX ADMIN — METHYLPREDNISOLONE ACETATE 80 MG: 80 INJECTION, SUSPENSION INTRA-ARTICULAR; INTRALESIONAL; INTRAMUSCULAR; SOFT TISSUE at 08:54

## 2024-04-02 RX ADMIN — SODIUM CHLORIDE 3 ML: 9 INJECTION, SOLUTION INTRAMUSCULAR; INTRAVENOUS; SUBCUTANEOUS at 08:54

## 2024-04-02 RX ADMIN — LIDOCAINE HYDROCHLORIDE 10 ML: 10 INJECTION, SOLUTION EPIDURAL; INFILTRATION; INTRACAUDAL; PERINEURAL at 08:43

## 2024-04-02 RX ADMIN — IOHEXOL 4 ML: 240 INJECTION, SOLUTION INTRATHECAL; INTRAVASCULAR; INTRAVENOUS; ORAL at 08:49

## 2024-04-02 NOTE — INTERVAL H&P NOTE
H&P reviewed. After examining the patient I find no changes in the patients condition since the H&P had been written.    Vitals:    04/02/24 0815   BP: 163/87   Pulse: 84   Resp: 18   Temp: 98.2 °F (36.8 °C)   SpO2: 99%

## 2024-04-02 NOTE — DISCHARGE INSTR - AVS FIRST PAGE
YOUR 2 WEEK FOLLOW UP HAS BEEN SCHEDULED; IF YOU WISH TO CHANGE THE FOLLOW UP, PLEASE CALL THE [facility] [place]: [phone]    Epidural Steroid Injection   WHAT YOU NEED TO KNOW:   An epidural steroid injection (FATUMA) is a procedure to inject steroid medicine into the epidural space. The epidural space is between your spinal cord and vertebrae. Steroids reduce inflammation and fluid buildup in your spine that may be causing pain. You may be given pain medicine along with the steroids.        DISCHARGE INSTRUCTIONS:   Call your local emergency number (911 in the [place]) if:   You have a seizure.    You have trouble moving your legs.    Seek care immediately if:   Blood soaks through your bandage.    You have a fever or chills, severe back pain, and the procedure area is sensitive to the touch.    You cannot control when you urinate or have a bowel movement.    Call your doctor if:   You have weakness or numbness in your legs.    Your wound is red, swollen, or draining pus.    You have nausea or are vomiting.    Your face or neck is red and you feel warm.    You have more pain than you had before the procedure.    You have swelling in your hands or feet.    You have questions or concerns about your condition or care.    Care for your wound as directed:  You may remove the bandage before you go to bed the day of your procedure. You may take a shower, but do not take a bath for at least 24 hours.   Self-care:   Do not drive,  use machines, or do strenuous activity for 24 hours after your procedure or as directed.     Continue other treatments  as directed. Steroid injections alone will not control your pain. The injections are meant to be used with other treatments, such as physical therapy.    Follow up with your doctor as directed:  Write down your questions so you remember to ask them during your visits.     EPIDURAL STEROID INJECTION DISCHARGE INSTRUCTIONS      ACTIVITY  Do not drive or operate  machinery today.  No strenuous activity today - bending, lifting, etc.   You may resume normal activities starting tomorrow - start slowly and as tolerated.  You may shower today, but not tub baths or hot tubs.  You may have numbness for several hours from the local anesthetics. Please use caution and common sense, especially with weight-bearing activities.    CARE OF THE INJECTION SITE  If you have soreness or pain apply ice to the area today (20 minutes on and 20 minutes off).  Starting tomorrow, you   Notify the [facility] if you have any of the following: redness, drainage, swelling or fever above 100°F.      MEDICATIONS  Continue to take all routine medications.  Our office may have instructed you to hold some medications. You may restart medications, including blood thinners.

## 2024-04-09 ENCOUNTER — TELEPHONE (OUTPATIENT)
Dept: OBGYN CLINIC | Facility: HOSPITAL | Age: 49
End: 2024-04-09

## 2024-04-09 NOTE — TELEPHONE ENCOUNTER
Patient reports no improvement post inj  Pain level 5/10   Pt stated she currently has burning sensation middle of her back and spasms.

## 2024-04-22 ENCOUNTER — OFFICE VISIT (OUTPATIENT)
Dept: PAIN MEDICINE | Facility: CLINIC | Age: 49
End: 2024-04-22
Payer: COMMERCIAL

## 2024-04-22 VITALS
WEIGHT: 280 LBS | RESPIRATION RATE: 18 BRPM | HEART RATE: 96 BPM | SYSTOLIC BLOOD PRESSURE: 120 MMHG | OXYGEN SATURATION: 94 % | HEIGHT: 65 IN | BODY MASS INDEX: 46.65 KG/M2 | TEMPERATURE: 97.1 F | DIASTOLIC BLOOD PRESSURE: 70 MMHG

## 2024-04-22 DIAGNOSIS — M79.18 MYOFASCIAL PAIN SYNDROME: ICD-10-CM

## 2024-04-22 DIAGNOSIS — M47.816 LUMBAR SPONDYLOSIS: ICD-10-CM

## 2024-04-22 PROCEDURE — 99214 OFFICE O/P EST MOD 30 MIN: CPT | Performed by: ANESTHESIOLOGY

## 2024-04-22 RX ORDER — CYCLOBENZAPRINE HCL 10 MG
10 TABLET ORAL 3 TIMES DAILY PRN
Qty: 270 TABLET | Refills: 3 | Status: SHIPPED | OUTPATIENT
Start: 2024-04-22

## 2024-04-22 RX ORDER — GABAPENTIN 300 MG/1
300 CAPSULE ORAL 3 TIMES DAILY
Qty: 270 CAPSULE | Refills: 3 | Status: SHIPPED | OUTPATIENT
Start: 2024-04-22

## 2024-04-22 NOTE — PROGRESS NOTES
Assessment  1. Lumbar spondylosis  -     cyclobenzaprine (FLEXERIL) 10 mg tablet; Take 1 tablet (10 mg total) by mouth 3 (three) times a day as needed for muscle spasms  -     gabapentin (NEURONTIN) 300 mg capsule; Take 1 capsule (300 mg total) by mouth 3 (three) times a day    2. Myofascial pain syndrome  -     cyclobenzaprine (FLEXERIL) 10 mg tablet; Take 1 tablet (10 mg total) by mouth 3 (three) times a day as needed for muscle spasms    3. Lumbar spondylosis - Bilateral  -     cyclobenzaprine (FLEXERIL) 10 mg tablet; Take 1 tablet (10 mg total) by mouth 3 (three) times a day as needed for muscle spasms  -     gabapentin (NEURONTIN) 300 mg capsule; Take 1 capsule (300 mg total) by mouth 3 (three) times a day    Greater than 80% relief of pain with improved ability to participate with IADLs after ILESI L4-L5. Previously reported the following symptomatology:     Axial low back pain described primarily by arthritic features.  Aching, nagging, indolent, stabbing, throbbing features in axial low back without radicular components.  5/5 strength bilaterally, negative SLR.  Positive facet loading maneuvers in lumbar spine elicited pain, positive tenderness to palpation over lumbar paraspinal muscles.  Findings correlate with prominent lumbar facet arthropathy seen throughout axial low back on x-ray.  Currently she is neurologically intact without gait instability, saddle anesthesia or bowel/bladder abnormality. Risks, benefits and alternatives to FATUMA, medial branch blocks and subsequent radiofrequency ablation if successful thoroughly discussed with patient.  Handouts provided questions answered to patient satisfaction.    The patient has been experiencing moderate to severe axial spine pain that is causing functional deficit.  The pain has been present for at least 3 months and is not improving with conservative care.  Currently the patient is not experiencing any radicular features nor neurogenic claudication.   Non-facet pathology has been ruled out on clinical evaluation.    Plan  -f/u prn  -gabapentin 300 mg t.i.d. Ordered for patient; counseled regarding sedative effects of taking this medication and provided up titration calendar.  Counseled not to take medication while driving or operating heavy machinery/using stairs. Advised caution with taking flexeril 10mg tid prn muscle spasms.  -script provided for formal physical therapy for lumbar spondylosis; Physician directed home exercise plan as per [employer] demonstrated and handouts provided that patient plans to participate with for 1 hour, twice a week for the next 6 weeks.     There are risks associated with opioid medications, including dependence, addiction and tolerance. The patient understands and agrees to use these medications only as prescribed. Potential side effects of the medications include, but are not limited to, constipation, drowsiness, addiction, impaired judgment and risk of fatal overdose if not taken as prescribed. The patient was warned against driving while taking sedation medications or operating heavy machinery. The patient voiced understanding. Sharing medications is a felony. At this point in time, the patient is showing no signs of addiction, abuse, diversion or suicidal ideation.     [place] Prescription Drug Monitoring Program report was reviewed and was appropriate      Complete risks and benefits including bleeding, infection, tissue reaction, nerve injury and allergic reaction were discussed. The approach was demonstrated using models and literature was provided. Verbal and written consent was obtained.     My impressions and treatment recommendations were discussed in detail with the patient who verbalized understanding and had no further questions.  Discharge instructions were provided. I personally saw and examined the patient and I agree with the above discussed plan of care.    New Medications Ordered This Visit   Medications     nicotine polacrilex (Nicorette) 2 mg gum     Sig: Chew 2 mg as needed for smoking cessation PRN    cyclobenzaprine (FLEXERIL) 10 mg tablet     Sig: Take 1 tablet (10 mg total) by mouth 3 (three) times a day as needed for muscle spasms     Dispense:  270 tablet     Refill:  3    gabapentin (NEURONTIN) 300 mg capsule     Sig: Take 1 capsule (300 mg total) by mouth 3 (three) times a day     Dispense:  270 capsule     Refill:  3       History of Present Illness    Greater than 80% relief of pain with improved ability to participate with IADLs after ILESI L4-L5. Previously reported the following symptomatology:     Barrie Gaona is a 49 y.o. female with pmhx of obesity, 1PPD smoker, presenting with with a past medical history of chronic low back pain described primarily as arthritic in nature.  She describes 8/10 low back pain that is worse in the mornings and worse at the end of the day.  The pain is characterized by achy, nagging, indolent, crampy, stabbing pain in her axial low back.  The patient describes that the pain is worse with standing for long periods of time on hard surfaces as well as with walking.  The patient is a very active individual and feels as though this pain compromises his participation with independent activities of daily living. The pain can be debilitating at times and contribute to significant disability, compromising overall activity and independent activities of daily living.  She has tried physical therapy with limited relief of symptoms.  Medications the patient has tried in the past include nsaids, tylenol. She describes no radicular symptoms and has good strength.  She denies any weakness numbness or paresthesias. The patient denies any bowel or bladder dysfunction, saddle anesthesia or gait instability as well.      I have personally reviewed and/or updated the patient's past medical history, past surgical history, family history, social history, current medications, allergies, and  vital signs today.     Review of Systems   Constitutional:  Positive for activity change.   HENT: Negative.     Eyes: Negative.    Respiratory: Negative.     Cardiovascular: Negative.    Gastrointestinal: Negative.    Endocrine: Negative.    Genitourinary: Negative.    Musculoskeletal:  Positive for arthralgias, back pain, gait problem and myalgias.   Skin: Negative.    Allergic/Immunologic: Negative.    Neurological:  Negative for weakness and numbness.   Hematological: Negative.    Psychiatric/Behavioral: Negative.     All other systems reviewed and are negative.      Patient Active Problem List   Diagnosis    Lumbar spondylosis       History reviewed. No pertinent past medical history.    Past Surgical History:   Procedure Laterality Date    IR SPINE AND PAIN PROCEDURE  2024    REPAIR LABRUM Left 2015       History reviewed. No pertinent family history.    Social History     Occupational History    Not on file   Tobacco Use    Smoking status: Former     Types: Cigarettes     Start date: 2024     Quit date: 1990     Years since quittin.3    Smokeless tobacco: Never   Vaping Use    Vaping status: Never Used   Substance and Sexual Activity    Alcohol use: Not Currently    Drug use: Not Currently    Sexual activity: Not on file       Current Outpatient Medications on File Prior to Visit   Medication Sig    nicotine polacrilex (Nicorette) 2 mg gum Chew 2 mg as needed for smoking cessation PRN    [DISCONTINUED] cyclobenzaprine (FLEXERIL) 10 mg tablet Take 1 tablet (10 mg total) by mouth 3 (three) times a day as needed for muscle spasms (Patient taking differently: Take 10 mg by mouth 2 (two) times a day)    [DISCONTINUED] gabapentin (NEURONTIN) 300 mg capsule Take 1 capsule (300 mg total) by mouth 3 (three) times a day (Patient taking differently: Take 300 mg by mouth 2 (two) times a day)     No current facility-administered medications on file prior to visit.       Allergies   Allergen Reactions  "   Penicillins Hives    Tetanus Toxoids Other (See Comments)     Paralysis    Tetanus-Diphth-Acell Pertussis Other (See Comments)     \"Paralyzed\"     Physical Exam    /70 (BP Location: Left arm, Patient Position: Sitting, Cuff Size: Large)   Pulse 96   Temp (!) 97.1 °F (36.2 °C)   Resp 18   Ht 5' 5\" (1.651 m)   Wt 127 kg (280 lb)   SpO2 94%   BMI 46.59 kg/m²     Constitutional: normal, well developed, well nourished, alert, in no distress and non-toxic and no overt pain behavior. and obese  Eyes: anicteric  HEENT: grossly intact  Neck: supple, symmetric, trachea midline and no masses   Pulmonary:even and unlabored  Cardiovascular:No edema or pitting edema present  Skin:Normal without rashes or lesions and well hydrated  Psychiatric:Mood and affect appropriate  Neurologic:Cranial Nerves II-XII grossly intact Sensation grossly intact; no clonus negative gonzalez's. Reflexes 2+ and brisk. SLR negative bilaterally.  Musculoskeletal:normal gait. 5/5 strength bilaterally with AROM in lower extremities. Normal heel toe and tip toe walking. Significant pain with lumbar facet loading bilaterally and with lateral spine rotation. ttp over lumbar paraspinal muscles. Negative regis's test, negative gaenslen's negative SIJ loading bilaterally.    Imaging    MRI LUMBAR SPINE WITHOUT CONTRAST     INDICATION: Incontinence.     COMPARISON:  None.     TECHNIQUE:  Multiplanar, multisequence imaging of the lumbar spine was performed. .        IMAGE QUALITY:  Diagnostic     FINDINGS:     VERTEBRAL BODIES:  There is a transitional lumbosacral junction with sacralization of L5 bilaterally.  Normal alignment of the lumbar spine.  No spondylolysis or spondylolisthesis. No scoliosis.  No compression fracture.    Normal marrow signal is   identified within the visualized bony structures.  No discrete marrow lesion.     SACRUM:  Normal signal within the sacrum. No evidence of insufficiency or stress fracture.     DISTAL CORD AND " CONUS:  Normal size and signal within the distal cord and conus.     PARASPINAL SOFT TISSUES:  Paraspinal soft tissues are unremarkable.     LOWER THORACIC DISC SPACES:  Normal disc height and signal.  No disc herniation, canal stenosis or foraminal narrowing.     LUMBAR DISC SPACES:     L1-L2:  Normal.     L2-L3: Minor bulge without significant stenosis.     L3-L4: Minor bulge and facet arthrosis resulting in mild left foraminal stenosis.     L4-L5: Disc desiccation with preserved disc height. Diffuse disc bulge and bilateral facet arthrosis with superimposed small central disc protrusion. Mild bilateral foraminal narrowing. Mild central canal stenosis.     L5-S1: Mild facet arthrosis without encroachment.     OTHER FINDINGS:  None.     IMPRESSION:  Mild spondylotic changes of the lumbar spine as detailed above.

## 2024-04-22 NOTE — LETTER
April 22, 2024     Patient: Barrie Gaona  YOB: 1975  Date of Visit: 4/22/2024      To Whom it May Concern:    Barrie Gaona is under my professional care. Barrie was seen in my office on 4/22/2024. Barrie may return to work without limitations.    If you have any questions or concerns, please don't hesitate to call.         Sincerely,          Rojelio Sin MD        CC:   No Recipients